# Patient Record
Sex: FEMALE
[De-identification: names, ages, dates, MRNs, and addresses within clinical notes are randomized per-mention and may not be internally consistent; named-entity substitution may affect disease eponyms.]

---

## 2019-10-01 PROBLEM — Z00.00 ENCOUNTER FOR PREVENTIVE HEALTH EXAMINATION: Status: ACTIVE | Noted: 2019-10-01

## 2019-10-04 ENCOUNTER — APPOINTMENT (OUTPATIENT)
Dept: ORTHOPEDIC SURGERY | Facility: CLINIC | Age: 39
End: 2019-10-04
Payer: MEDICAID

## 2019-10-04 VITALS
DIASTOLIC BLOOD PRESSURE: 64 MMHG | HEIGHT: 62 IN | WEIGHT: 98 LBS | HEART RATE: 86 BPM | BODY MASS INDEX: 18.03 KG/M2 | SYSTOLIC BLOOD PRESSURE: 109 MMHG

## 2019-10-04 DIAGNOSIS — M77.11 LATERAL EPICONDYLITIS, RIGHT ELBOW: ICD-10-CM

## 2019-10-04 DIAGNOSIS — Z78.9 OTHER SPECIFIED HEALTH STATUS: ICD-10-CM

## 2019-10-04 PROCEDURE — 99204 OFFICE O/P NEW MOD 45 MIN: CPT

## 2019-10-04 NOTE — HISTORY OF PRESENT ILLNESS
[de-identified] : Patient is here for right elbow pain. She does not recall when it began but states that it has been over 3 months. She does not recall any inciting injury. She has pain with grasping activities. She went to urgent care and was given Ibuprofen which has helped some but the pain persists. Denies N/T/R/Prior injury. She works as a  at a restaurant. \par \par The patient's past medical history, past surgical history, medications and allergies were reviewed by me today and documented accordingly. In addition, the patient's family and social history, which were noncontributory to this visit, were reviewed also. The patient has no family history of arthritis.

## 2019-10-04 NOTE — PHYSICAL EXAM
[de-identified] : Constitutional: Well-nourished, well-developed, No acute distress\par Respiratory:  Good respiratory effort, no SOB\par Lymphatic: No regional lymphadenopathy, no lymphedema\par Psychiatric: Pleasant and normal affect, alert and oriented x3\par Skin: Clean dry and intact B/L UE/LE\par Musculoskeletal: normal except where as noted in regional exam\par \par Left Elbow:\par APPEARANCE: no marked deformities, no swelling or malalignment\par POSITIVE TENDERNESS: none\par NONTENDER: lateral and medial epicondyles, posterior capsules, and other areas of the elbow. \par ROM: full & painless flext/ext, pronation/supination. \par RESISTIVE TESTING: painless resisted elbow flexion/extension, wrist/long finger extension. painless resisted wrist/long finger flexion. painless resisted supination/pronation. \par SPECIAL TESTS: stable v/v stress. neg tinel's cubital tunnel\par Vasc: 2+ radial pulse\par Neuro: AIN, PIN, Ulnar nerve intact to motor\par Sensation: Intact to light touch throughout\par B/L Shoulders:  No asymmetry, malalignment, or swelling, Full ROM, 5/5 strength in flexion/ext, Abd/Add, IR/ER, Joints stable\par B/L Wrist and Hand:  No asymmetry, malalignment, or swelling, Full ROM, 5/5 strength in wrist and long finger flexion/ext, and radial/ulnar deviation, Joints stable\par \par Right Elbow:\par APPEARANCE: no marked deformities, no swelling or malalignment\par POSITIVE TENDERNESS: + common extensor bundle at the lateral epicondyle\par NONTENDER: medial epicondyle, posterior capsules, and other areas of the elbow. \par ROM: full, mild pain with end range of wrist flexion and forearm pronation,  painless wrist ext / forearm supination. \par RESISTIVE TESTING: resisted wrist/long finger extension reproduces pain at the lateral epicondyle. resisted supination also reproduces some pain. painless resisted wrist flexion. painless resisted pronation. \par SPECIAL TESTS: stable v/v stress. neg tinel's cubital tunnel\par Vasc: 2+ radial pulse\par Neuro: AIN, PIN, Ulnar nerve intact to motor\par Sensation: Intact to light touch throughout\par \par

## 2019-10-04 NOTE — DISCUSSION/SUMMARY
[de-identified] : Discussed findings of today's exam and possible causes of patient's pain.  Educated patient on their most probable diagnosis of right lateral epicondylitis.  Reviewed possible courses of treatment, and we collaboratively decided best course of treatment at this time will include conservative management.  Patient will be started on a course of physical therapy to restore normal range of motion and strength as tolerated. Patient advised to  over-the-counter tennis elbow brace to be worn during the day for support. Patient advised to  topical anti-inflammatory cream available over-the-counter. May consider cortisone injection if not improving.  Follow up as needed.  Patient appreciates and agrees with current plan.\par \par This note was generated using dragon medical dictation software.  A reasonable effort has been made for proofreading its contents, but typos may still remain.  If there are any questions or points of clarification needed please notify my office.